# Patient Record
Sex: MALE
[De-identification: names, ages, dates, MRNs, and addresses within clinical notes are randomized per-mention and may not be internally consistent; named-entity substitution may affect disease eponyms.]

---

## 2019-06-17 ENCOUNTER — HOSPITAL ENCOUNTER (EMERGENCY)
Dept: HOSPITAL 92 - ERS | Age: 24
Discharge: LEFT BEFORE DISCHARGE | End: 2019-06-17
Payer: SELF-PAY

## 2019-06-17 DIAGNOSIS — Z53.21: Primary | ICD-10-CM

## 2019-10-30 ENCOUNTER — HOSPITAL ENCOUNTER (OUTPATIENT)
Dept: HOSPITAL 92 - SDC | Age: 24
Discharge: HOME | End: 2019-10-30
Attending: SURGERY
Payer: SELF-PAY

## 2019-10-30 DIAGNOSIS — K35.30: Primary | ICD-10-CM

## 2019-10-30 PROCEDURE — 0DTJ4ZZ RESECTION OF APPENDIX, PERCUTANEOUS ENDOSCOPIC APPROACH: ICD-10-PCS | Performed by: SURGERY

## 2019-10-30 PROCEDURE — 88304 TISSUE EXAM BY PATHOLOGIST: CPT

## 2019-10-30 NOTE — HP
CHIEF COMPLAINT:  Abdominal pain.



HISTORY OF PRESENT ILLNESS:  The patient is a 24-year-old male, with a 24-hour

history of mid abdominal pain associated with nausea and vomiting.  It is now in

right lower quadrant.  CT scan shows appendicitis. 



PAST MEDICAL HISTORY:  Otherwise, healthy.



PAST SURGICAL HISTORY:  He had removal of a coin from his esophagus.



MEDICATIONS:  No medications.



ALLERGIES:  NO KNOWN DRUG ALLERGIES.



SOCIAL HISTORY:  He is a student.  He is single.  He works at a [employer] as well.

 No tobacco.  No alcohol. 



FAMILY HISTORY:  Diabetes and cardiac disease.



PHYSICAL EXAMINATION:

VITAL SIGNS:  Blood pressure 119/55, pulse 62, and temperature 98.7. 

GENERAL:  Well-developed, well-nourished male, in minimal distress. 

HEENT:  Unremarkable. 

LUNGS:  Clear. 

HEART:  Regular rate and rhythm. 

ABDOMEN:  Soft and nondistended.  He is very tender in the right lower quadrant. 

EXTREMITIES:  Unremarkable.



LABORATORY DATA:  His white count is 19,000.



ASSESSMENT:  Acute appendicitis.



PLAN:  Laparoscopic appendectomy.



CONSENT:  I have discussed planned procedure as well as risk of bleeding, infection,

injury to bowel, bladder, need to open, he understands and gives informed consent. 







Job ID:  [id number]

## 2019-10-31 NOTE — OP
DATE OF PROCEDURE:  10/30/2019



PREOPERATIVE DIAGNOSIS:  Acute appendicitis.



PROCEDURE PERFORMED:  Laparoscopic appendectomy.



INDICATIONS:  A 24-year-old male  with a 24-hour history of mid epigastric pain,

which became more right lower quadrant.  CT showed appendicitis. 



FINDINGS:  Acute suppurative nonperforated appendicitis.



DESCRIPTION OF PROCEDURE:  After informed consent was obtained, the patient was

taken to the operating room, given general endotracheal anesthesia, and placed in

supine position.  Abdomen was prepped and draped in usual fashion.  Local anesthesia

infiltrated subcutaneously and deep.  A subumbilical incision was performed.  Subcu

divided sharply.  The fascia was grasped with 2 stay sutures of 0 Vicryl placed

through side of midline.  Midline incised.  Digital palpation revealed no local

adhesions.  A blunt 12 mm trocar inserted.  Pneumoperitoneum was created to a

pressure of 15 mmHg.  A 0 degree laparoscope inserted under direct vision.  Two 5-mm

ports were placed, one suprapubic and one right lateral abdomen.  The appendix was

grasped.  The mesoappendix was divided with LigaSure.  The base of the appendix was

divided with the linear 45 mm stapler.  The appendix was placed in endosac, removed

from the abdomen in the endosac.  Hemostasis was assured.  The abdomen irrigated.

Irrigation fluid removed.  Trocars and retractors removed.  The fascia closed with

interrupted 0 Vicryl suture.  Skin was closed with interrupted 4-0 Rapide.

Dermabond applied.  The patient tolerated the procedure well, transferred to

Recovery in good condition.  Sponge and needle count verified correct x2. 







Job ID:  [id number]

## 2020-03-04 ENCOUNTER — HOSPITAL ENCOUNTER (EMERGENCY)
Dept: HOSPITAL 92 - ERS | Age: 25
Discharge: HOME | End: 2020-03-04
Payer: SELF-PAY

## 2020-03-04 DIAGNOSIS — R55: Primary | ICD-10-CM

## 2020-03-04 DIAGNOSIS — F41.0: ICD-10-CM

## 2020-03-04 PROCEDURE — 93005 ELECTROCARDIOGRAM TRACING: CPT

## 2020-03-07 NOTE — EKG
Test Reason : 

Blood Pressure : ***/*** mmHG

Vent. Rate : 066 BPM     Atrial Rate : 066 BPM

   P-R Int : 164 ms          QRS Dur : 094 ms

    QT Int : 384 ms       P-R-T Axes : 056 032 025 degrees

   QTc Int : 402 ms

 

Normal sinus rhythm with sinus arrhythmia

Normal ECG

 

Confirmed by ALESHA VASQUES, CJ ([id number]),  LILIANA JOSHI ([id number]) on 3/7/2020 3:48:59 PM

 

Referred By:             Confirmed By:JC EDUARDO MD